# Patient Record
Sex: FEMALE | Race: WHITE | URBAN - METROPOLITAN AREA
[De-identification: names, ages, dates, MRNs, and addresses within clinical notes are randomized per-mention and may not be internally consistent; named-entity substitution may affect disease eponyms.]

---

## 2018-12-05 ENCOUNTER — OFFICE VISIT (OUTPATIENT)
Dept: FAMILY MEDICINE CLINIC | Age: 63
End: 2018-12-05

## 2018-12-05 VITALS
DIASTOLIC BLOOD PRESSURE: 89 MMHG | WEIGHT: 237 LBS | BODY MASS INDEX: 37.2 KG/M2 | RESPIRATION RATE: 16 BRPM | HEART RATE: 83 BPM | HEIGHT: 67 IN | TEMPERATURE: 98.4 F | SYSTOLIC BLOOD PRESSURE: 129 MMHG | OXYGEN SATURATION: 94 %

## 2018-12-05 DIAGNOSIS — J01.81 OTHER ACUTE RECURRENT SINUSITIS: Primary | ICD-10-CM

## 2018-12-05 DIAGNOSIS — E11.9 TYPE 2 DIABETES MELLITUS WITHOUT COMPLICATION, WITHOUT LONG-TERM CURRENT USE OF INSULIN (HCC): ICD-10-CM

## 2018-12-05 RX ORDER — DOXYCYCLINE 100 MG/1
100 TABLET ORAL 2 TIMES DAILY
Qty: 20 TAB | Refills: 0 | Status: SHIPPED | OUTPATIENT
Start: 2018-12-05 | End: 2018-12-15

## 2018-12-05 RX ORDER — ZINC GLUCONATE 10 MG
LOZENGE ORAL
COMMUNITY

## 2018-12-05 RX ORDER — CHOLECALCIFEROL (VITAMIN D3) 125 MCG
400 CAPSULE ORAL
COMMUNITY

## 2018-12-05 RX ORDER — LEVOTHYROXINE SODIUM 75 UG/1
TABLET ORAL
Refills: 3 | COMMUNITY
Start: 2018-11-25 | End: 2019-03-23 | Stop reason: SDUPTHER

## 2018-12-05 RX ORDER — METFORMIN HYDROCHLORIDE 500 MG/1
TABLET ORAL
Refills: 3 | COMMUNITY
Start: 2018-10-19 | End: 2018-12-06 | Stop reason: SDUPTHER

## 2018-12-05 RX ORDER — UREA 10 %
100 LOTION (ML) TOPICAL DAILY
COMMUNITY

## 2018-12-05 RX ORDER — ASPIRIN 81 MG/1
TABLET ORAL DAILY
COMMUNITY

## 2018-12-05 RX ORDER — CLINDAMYCIN HYDROCHLORIDE 300 MG/1
CAPSULE ORAL
Refills: 0 | COMMUNITY
Start: 2018-10-24 | End: 2018-12-05 | Stop reason: CLARIF

## 2018-12-05 RX ORDER — LISINOPRIL 5 MG/1
TABLET ORAL
Refills: 2 | COMMUNITY
Start: 2018-09-02 | End: 2019-06-11 | Stop reason: SDUPTHER

## 2018-12-05 NOTE — PROGRESS NOTES
[address] [facility] [address] Dr[address] Tyler Coronachrystal Chandler Phone:  [phone] Fax:  [phone] Progress Note Name:  Roberto Max Age:  59 y.o. 
:  1955 Encounter Date:  2018 Primary Care Provider:  No primary care provider on file. Chief Complaint Patient presents with  Follow Up Chronic Condition  
  diabetes HPI: 
Patient here to follow-up sinuses and diabetes and determine medication refill and adjustments and appropriate lab evaluation. Patient is compliant with medications and no new side effects. Past Medical History:  
Diagnosis Date  Anxiety  Depression  Diabetes ([address] type 2  
 FHx: breast cancer  HTN (hypertension)  Hypothyroidism  Psoriasis  Varicose vein of leg Past Surgical History:  
Procedure Laterality Date  HX BREAST REDUCTION    
 HX CHOLECYSTECTOMY  HX HYSTERECTOMY  HX OOPHORECTOMY Family History Problem Relation Age of Onset  Breast Cancer Mother  COPD Mother  Alcohol abuse Father Social History Socioeconomic History  Marital status:  Spouse name: Not on file  Number of children: Not on file  Years of education: Not on file  Highest education level: Not on file Tobacco Use  Smoking status: Former Smoker Packs/day: 0.25 Types: Cigarettes Last attempt to quit: 2018 Years since quittin.5  Smokeless tobacco: Never Used Substance and Sexual Activity  Alcohol use: No  
  Frequency: Never  Drug use: No  
 Sexual activity: No  
 
 
Current Outpatient Medications Medication Sig Dispense Refill  ONETOUCH ULTRA BLUE TEST STRIP strip TEST ONCE DAILY AS DIRECTED  3  
 levothyroxine (SYNTHROID) 75 mcg tablet TAKE 1 TABLET BY MOUTH EVERY DAY  3  
 lisinopril (PRINIVIL, ZESTRIL) 5 mg tablet TAKE 1 TABLET BY MOUTH EVERY DAY *NEED APPT*  2  
  metFORMIN (GLUCOPHAGE) 500 mg tablet TAKE 2 TABLETS BY MOUTH EVERY MORNING AND 1 TABLET IN THE EVENING  3  
 aspirin delayed-release 81 mg tablet Take  by mouth daily.  cholecalciferol, vitamin D3, (VITAMIN D3) 2,000 unit tab Take  by mouth.  magnesium 250 mg tab Take  by mouth.  grape seed extract 150 mg TbER Take  by mouth.  cyanocobalamin (VITAMIN B12) 100 mcg tablet Take 100 mcg by mouth daily.  multivitamin, tx-iron-ca-min (THERA-M W/ IRON) 9 mg iron-400 mcg tab tablet Take 1 Tab by mouth daily.  B.infantis-B.ani-B.long-B.bifi (PROBIOTIC 4X) 10-15 mg TbEC Take  by mouth.  doxycycline (ADOXA) 100 mg tablet Take 1 Tab by mouth two (2) times a day for 10 days. 20 Tab 0 Allergies Allergen Reactions  Penicillins Other (comments)  
  rash Patient Active Problem List  
Diagnosis Code  Other acute recurrent sinusitis J01.81  
 [facility] E11.9 Review of Systems Constitutional: Negative for fever. Respiratory: Negative for shortness of breath. Cardiovascular: Negative for chest pain, orthopnea and PND. Gastrointestinal: Negative for abdominal pain, nausea and vomiting. Visit Vitals /89 (BP 1 Location: Left arm, BP Patient Position: Sitting) Pulse 83 Temp 98.4 °F (36.9 °C) (Oral) Resp 16 Ht 5' 7\" (1.702 m) Wt 237 lb (107.5 kg) SpO2 94% BMI 37.12 kg/m² Physical Exam  
Constitutional: She is oriented to person, place, and time and well-developed, well-nourished, and in no distress. Cardiovascular: Normal rate, regular rhythm and normal heart sounds. Pulmonary/Chest: Effort normal and breath sounds normal. No respiratory distress. She has no wheezes. Neurological: She is alert and oriented to person, place, and time. Skin: Skin is warm and dry. Labs/Radiology Reviewed Assessment/Plan: ICD-10-CM ICD-9-CM 1. Other acute recurrent sinusitis J01.81 461.9 doxycycline (ADOXA) 100 mg tablet 2. Type 2 diabetes mellitus without complication, without long-term current use of insulin (HCC) E11.9 250.00 HEMOGLOBIN A1C WITH EAG Orders Placed This Encounter  HEMOGLOBIN A1C WITH EAG  
 doxycycline (ADOXA) 100 mg tablet Follow-up Disposition: Not on File Carlos Mora MD 
12/5/2018

## 2018-12-05 NOTE — PROGRESS NOTES
Identified pt with two pt identifiers(name and ). Chief Complaint Patient presents with  Follow Up Chronic Condition  
  diabetes Health Maintenance Due Topic  Hepatitis C Screening  DTaP/Tdap/Td series (1 - Tdap)  PAP AKA CERVICAL CYTOLOGY  Shingrix Vaccine Age 50> (1 of 2)  BREAST CANCER SCRN MAMMOGRAM   
 FOBT Q 1 YEAR AGE 50-75  Influenza Age 5 to Adult Wt Readings from Last 3 Encounters:  
18 237 lb (107.5 kg) Temp Readings from Last 3 Encounters:  
No data found for Temp BP Readings from Last 3 Encounters:  
No data found for BP Pulse Readings from Last 3 Encounters:  
No data found for Pulse Learning Assessment: 
:  
 
No flowsheet data found. Depression Screening: 
:  
 
No flowsheet data found. Fall Risk Assessment: 
:  
 
No flowsheet data found. Abuse Screening: 
:  
 
No flowsheet data found. Coordination of Care Questionnaire: 
:  
 
1) Have you been to an emergency room, urgent care clinic since your last visit? no  
Hospitalized since your last visit? no          
 
2) Have you seen or consulted any other health care providers outside of [address] [place] [address] since your last visit? no  (Include any pap smears or colon screenings in this section.) 3) Do you have an Advance Directive on file? no 
Are you interested in receiving information about Advance Directives? no 
 
Patient is accompanied by self I have received verbal consent from Kiera Grande to discuss any/all medical information while they are present in the room. Reviewed record in preparation for visit and have obtained necessary documentation. Medication reconciliation up to date and corrected with patient at this time.

## 2018-12-06 LAB
EST. AVERAGE GLUCOSE BLD GHB EST-MCNC: 174 MG/DL
HBA1C MFR BLD: 7.7 % (ref 4.8–5.6)

## 2018-12-06 RX ORDER — METFORMIN HYDROCHLORIDE 500 MG/1
1000 TABLET ORAL 2 TIMES DAILY WITH MEALS
Qty: 180 TAB | Refills: 3 | Status: SHIPPED | OUTPATIENT
Start: 2018-12-06 | End: 2019-06-11 | Stop reason: SDUPTHER

## 2019-05-10 ENCOUNTER — TELEPHONE (OUTPATIENT)
Dept: FAMILY MEDICINE CLINIC | Age: 64
End: 2019-05-10

## 2019-05-12 DIAGNOSIS — I10 HYPERTENSION, UNSPECIFIED TYPE: ICD-10-CM

## 2019-05-12 DIAGNOSIS — E78.00 HYPERCHOLESTEREMIA: ICD-10-CM

## 2019-05-12 DIAGNOSIS — E11.9 TYPE 2 DIABETES MELLITUS WITHOUT COMPLICATION, WITHOUT LONG-TERM CURRENT USE OF INSULIN (HCC): ICD-10-CM

## 2019-05-12 DIAGNOSIS — E03.9 ACQUIRED HYPOTHYROIDISM: ICD-10-CM

## 2019-05-12 DIAGNOSIS — Z00.00 ANNUAL PHYSICAL EXAM: Primary | ICD-10-CM

## 2019-06-06 LAB
ALBUMIN SERPL-MCNC: 4.2 G/DL (ref 3.6–4.8)
ALBUMIN/CREAT UR: <3.9 MG/G CREAT (ref 0–30)
ALBUMIN/GLOB SERPL: 1.6 {RATIO} (ref 1.2–2.2)
ALP SERPL-CCNC: 123 IU/L (ref 39–117)
ALT SERPL-CCNC: 50 IU/L (ref 0–32)
APPEARANCE UR: CLEAR
AST SERPL-CCNC: 37 IU/L (ref 0–40)
BASOPHILS # BLD AUTO: 0 X10E3/UL (ref 0–0.2)
BASOPHILS NFR BLD AUTO: 1 %
BILIRUB SERPL-MCNC: 0.5 MG/DL (ref 0–1.2)
BILIRUB UR QL STRIP: NEGATIVE
BUN SERPL-MCNC: 13 MG/DL (ref 8–27)
BUN/CREAT SERPL: 21 (ref 12–28)
CALCIUM SERPL-MCNC: 10.2 MG/DL (ref 8.7–10.3)
CHLORIDE SERPL-SCNC: 105 MMOL/L (ref 96–106)
CHOLEST SERPL-MCNC: 109 MG/DL (ref 100–199)
CO2 SERPL-SCNC: 21 MMOL/L (ref 20–29)
COLOR UR: YELLOW
CREAT SERPL-MCNC: 0.62 MG/DL (ref 0.57–1)
CREAT UR-MCNC: 76.4 MG/DL
EOSINOPHIL # BLD AUTO: 0.2 X10E3/UL (ref 0–0.4)
EOSINOPHIL NFR BLD AUTO: 2 %
ERYTHROCYTE [DISTWIDTH] IN BLOOD BY AUTOMATED COUNT: 14.1 % (ref 12.3–15.4)
EST. AVERAGE GLUCOSE BLD GHB EST-MCNC: 157 MG/DL
GLOBULIN SER CALC-MCNC: 2.6 G/DL (ref 1.5–4.5)
GLUCOSE SERPL-MCNC: 142 MG/DL (ref 65–99)
GLUCOSE UR QL: NEGATIVE
HBA1C MFR BLD: 7.1 % (ref 4.8–5.6)
HCT VFR BLD AUTO: 45.2 % (ref 34–46.6)
HDLC SERPL-MCNC: 57 MG/DL
HGB BLD-MCNC: 14.9 G/DL (ref 11.1–15.9)
HGB UR QL STRIP: NEGATIVE
IMM GRANULOCYTES # BLD AUTO: 0 X10E3/UL (ref 0–0.1)
IMM GRANULOCYTES NFR BLD AUTO: 0 %
KETONES UR QL STRIP: NEGATIVE
LDLC SERPL CALC-MCNC: 40 MG/DL (ref 0–99)
LEUKOCYTE ESTERASE UR QL STRIP: NEGATIVE
LYMPHOCYTES # BLD AUTO: 2.7 X10E3/UL (ref 0.7–3.1)
LYMPHOCYTES NFR BLD AUTO: 36 %
MCH RBC QN AUTO: 31 PG (ref 26.6–33)
MCHC RBC AUTO-ENTMCNC: 33 G/DL (ref 31.5–35.7)
MCV RBC AUTO: 94 FL (ref 79–97)
MICRO URNS: NORMAL
MICROALBUMIN UR-MCNC: <3 UG/ML
MONOCYTES # BLD AUTO: 0.7 X10E3/UL (ref 0.1–0.9)
MONOCYTES NFR BLD AUTO: 9 %
NEUTROPHILS # BLD AUTO: 4 X10E3/UL (ref 1.4–7)
NEUTROPHILS NFR BLD AUTO: 52 %
NITRITE UR QL STRIP: NEGATIVE
PH UR STRIP: 5.5 [PH] (ref 5–7.5)
PLATELET # BLD AUTO: 201 X10E3/UL (ref 150–450)
POTASSIUM SERPL-SCNC: 5 MMOL/L (ref 3.5–5.2)
PROT SERPL-MCNC: 6.8 G/DL (ref 6–8.5)
PROT UR QL STRIP: NEGATIVE
RBC # BLD AUTO: 4.8 X10E6/UL (ref 3.77–5.28)
SODIUM SERPL-SCNC: 143 MMOL/L (ref 134–144)
SP GR UR: 1.02 (ref 1–1.03)
TRIGL SERPL-MCNC: 61 MG/DL (ref 0–149)
TSH SERPL DL<=0.005 MIU/L-ACNC: 1.26 UIU/ML (ref 0.45–4.5)
UROBILINOGEN UR STRIP-MCNC: 0.2 MG/DL (ref 0.2–1)
VLDLC SERPL CALC-MCNC: 12 MG/DL (ref 5–40)
WBC # BLD AUTO: 7.6 X10E3/UL (ref 3.4–10.8)

## 2019-06-11 ENCOUNTER — OFFICE VISIT (OUTPATIENT)
Dept: FAMILY MEDICINE CLINIC | Age: 64
End: 2019-06-11

## 2019-06-11 VITALS
HEIGHT: 67 IN | TEMPERATURE: 98.9 F | SYSTOLIC BLOOD PRESSURE: 130 MMHG | DIASTOLIC BLOOD PRESSURE: 87 MMHG | OXYGEN SATURATION: 97 % | RESPIRATION RATE: 16 BRPM | WEIGHT: 230 LBS | HEART RATE: 85 BPM | BODY MASS INDEX: 36.1 KG/M2

## 2019-06-11 DIAGNOSIS — E11.9 TYPE 2 DIABETES MELLITUS WITHOUT COMPLICATION, WITHOUT LONG-TERM CURRENT USE OF INSULIN (HCC): ICD-10-CM

## 2019-06-11 DIAGNOSIS — E03.9 ACQUIRED HYPOTHYROIDISM: ICD-10-CM

## 2019-06-11 DIAGNOSIS — E66.01 SEVERE OBESITY (HCC): ICD-10-CM

## 2019-06-11 DIAGNOSIS — I10 HYPERTENSION, UNSPECIFIED TYPE: Primary | ICD-10-CM

## 2019-06-11 DIAGNOSIS — R79.89 ABNORMAL LFTS: ICD-10-CM

## 2019-06-11 RX ORDER — LEVOTHYROXINE SODIUM 75 UG/1
TABLET ORAL
Qty: 90 TAB | Refills: 3 | Status: SHIPPED | OUTPATIENT
Start: 2019-06-11 | End: 2020-05-19 | Stop reason: SDUPTHER

## 2019-06-11 RX ORDER — METFORMIN HYDROCHLORIDE 500 MG/1
1000 TABLET ORAL 2 TIMES DAILY WITH MEALS
Qty: 360 TAB | Refills: 3 | Status: SHIPPED | OUTPATIENT
Start: 2019-06-11 | End: 2020-05-19 | Stop reason: SDUPTHER

## 2019-06-11 RX ORDER — LISINOPRIL 5 MG/1
TABLET ORAL
Qty: 90 TAB | Refills: 3 | Status: SHIPPED | OUTPATIENT
Start: 2019-06-11 | End: 2020-05-19 | Stop reason: SDUPTHER

## 2019-06-11 NOTE — PROGRESS NOTES
[address] [facility]  [address], [address] [facility], [address] [place] [address]  Dr. Kristina Miranda. Timmy Barrera  Phone:  [phone]  Fax:  [phone]    Progress Note    Name:  Merlin Dowse  Age:  62 y.o.  :  1955  Encounter Date:  2019    Primary Care Provider:  No primary care provider on file. Chief Complaint   Patient presents with    Labs     FOLLOW UP     HPI:  Patient here for annual exam and determine medication refill and adjustments and appropriate lab evaluation. Patient is compliant with medications and no new side effects. Past Medical History:   Diagnosis Date    Anxiety     Depression     Diabetes ([address]     type 2    FHx: breast cancer     HTN (hypertension)     Hypothyroidism     Psoriasis     Varicose vein of leg      Past Surgical History:   Procedure Laterality Date    HX BREAST REDUCTION      HX CHOLECYSTECTOMY      HX HYSTERECTOMY      HX OOPHORECTOMY       Family History   Problem Relation Age of Onset    Breast Cancer Mother     COPD Mother     Alcohol abuse Father      Social History     Socioeconomic History    Marital status:      Spouse name: Not on file    Number of children: Not on file    Years of education: Not on file    Highest education level: Not on file   Tobacco Use    Smoking status: Former Smoker     Packs/day: 0.25     Types: Cigarettes     Last attempt to quit: 2018     Years since quittin.1    Smokeless tobacco: Never Used   Substance and Sexual Activity    Alcohol use: No     Frequency: Never    Drug use: No    Sexual activity: Never     COLONOSCOPY: 2019-- decline colonscopy this visit. MAMMOGRAM: 2019-- decline mammograms this visit. She will see Gyn soon. INFLUENZA VACCINE: was last done 2019- declines   TETANUS VACCINE:2019--  It has been 10 years since your last tetanus shot. If you cut yourself, please schedule an appointment and come to the office within 3 days for a tetanus booster.      Shingles: 2019 declined  PAP smear -- no more since Hysterectomy  No alcohol and no smoking  Exercise -- trying to increase      Current Outpatient Medications   Medication Sig Dispense Refill    levothyroxine (SYNTHROID) 75 mcg tablet TAKE 1 TABLET BY MOUTH EVERY DAY 90 Tab 3    metFORMIN (GLUCOPHAGE) 500 mg tablet Take 2 Tabs by mouth two (2) times daily (with meals). 360 Tab 3    ONETOUCH ULTRA BLUE TEST STRIP strip TEST ONCE DAILY AS DIRECTED 90 Strip 3    lisinopril (PRINIVIL, ZESTRIL) 5 mg tablet TAKE 1 TABLET BY MOUTH EVERY DAY 90 Tab 3    aspirin delayed-release 81 mg tablet Take  by mouth daily.  cholecalciferol, vitamin D3, (VITAMIN D3) 2,000 unit tab Take  by mouth.  grape seed extract 150 mg TbER Take  by mouth.  cyanocobalamin (VITAMIN B12) 100 mcg tablet Take 100 mcg by mouth daily.  multivitamin, tx-iron-ca-min (THERA-M W/ IRON) 9 mg iron-400 mcg tab tablet Take 1 Tab by mouth daily.  B.infantis-B.ani-B.long-B.bifi (PROBIOTIC 4X) 10-15 mg TbEC Take  by mouth.  magnesium 250 mg tab Take  by mouth. Allergies   Allergen Reactions    Penicillins Other (comments)     rash     Patient Active Problem List   Diagnosis Code    Other acute recurrent sinusitis J01.81    Diabetes ([address] E11.9    Severe obesity ([facility]) E66.01       ROS    Visit Vitals  /87   Pulse 85   Temp 98.9 °F (37.2 °C) (Oral)   Resp 16   Ht 5' 7\" (1.702 m)   Wt 230 lb (104.3 kg)   SpO2 97%   BMI 36.02 kg/m²     Physical Exam   Constitutional: She is oriented to person, place, and time and well-developed, well-nourished, and in no distress. Cardiovascular: Normal rate, regular rhythm and normal heart sounds. Pulmonary/Chest: Effort normal and breath sounds normal. No respiratory distress. She has no wheezes. Neurological: She is alert and oriented to person, place, and time. Skin: Skin is warm and dry.    pt declined breast exam    Labs/Radiology Reviewed    Assessment/Plan:    ICD-10-CM ICD-9-CM 1. Hypertension, unspecified type I10 401.9 lisinopril (PRINIVIL, ZESTRIL) 5 mg tablet   2. Severe obesity ([address] E66.01 278.01    3. Type 2 diabetes mellitus without complication, without long-term current use of insulin (HCC) E11.9 250.00 metFORMIN (GLUCOPHAGE) 500 mg tablet      ONETOUCH ULTRA BLUE TEST STRIP strip      HEMOGLOBIN A1C WITH EAG   4. Acquired hypothyroidism E03.9 244.9 levothyroxine (SYNTHROID) 75 mcg tablet   5. Abnormal LFTs R94.5 790.6 HEPATITIS PANEL, ACUTE      HEPATIC FUNCTION PANEL       Orders Placed This Encounter    HEPATITIS PANEL, ACUTE    HEPATIC FUNCTION PANEL    HEMOGLOBIN A1C WITH EAG    levothyroxine (SYNTHROID) 75 mcg tablet    metFORMIN (GLUCOPHAGE) 500 mg tablet    ONETOUCH ULTRA BLUE TEST STRIP strip    lisinopril (PRINIVIL, ZESTRIL) 5 mg tablet       Follow-up and Dispositions    · Return in about 6 months (around 12/11/2019) for for diabetes follow up. LFT's need to be rechecked in 6 months and A1C in 6 months. Pt is losing weight which is good. Health is stable, life style good, immunizations reviewed and screening discussed and done as per patient's desires. Exercise includes 4 components:  Stretching, core muscle, cardiovascular and balance techniques. Good posture is protective to your back - stand straight as much as possible. Exercise daily of 40 minutes of active exercise encouraged, a balanced diet with portions of fruits, vegetables and salads recommended. Watch sodium intake if high blood pressure is an issue. Labs reviewed with pt.      Roseann Wilhelm MD  6/11/2019

## 2019-06-11 NOTE — PROGRESS NOTES
PATIENT STATED NAME &     Chief Complaint   Patient presents with   [address] Maintenance Due   Topic    Hepatitis C Screening     Pneumococcal 0-64 years (1 of 1 - PPSV23)    FOOT EXAM Q1     EYE EXAM RETINAL OR DILATED     DTaP/Tdap/Td series (1 - Tdap)    PAP AKA CERVICAL CYTOLOGY     Shingrix Vaccine Age 50> (1 of 2)    BREAST CANCER SCRN MAMMOGRAM     FOBT Q 1 YEAR AGE 50-75        Wt Readings from Last 3 Encounters:   19 230 lb (104.3 kg)   18 237 lb (107.5 kg)     Temp Readings from Last 3 Encounters:   19 98.9 °F (37.2 °C) (Oral)   18 98.4 °F (36.9 °C) (Oral)     BP Readings from Last 3 Encounters:   19 130/87   18 129/89     Pulse Readings from Last 3 Encounters:   19 85   18 83         Learning Assessment:  :     Learning Assessment 2018   PRIMARY LEARNER Patient   PRIMARY LANGUAGE ENGLISH   LEARNER PREFERENCE PRIMARY DEMONSTRATION   ANSWERED BY patient   RELATIONSHIP SELF       Depression Screening:  :     3 most recent PHQ Screens 2018   Little interest or pleasure in doing things Several days   Feeling down, depressed, irritable, or hopeless Several days   Total Score PHQ 2 2       Fall Risk Assessment:  :     No flowsheet data found. Abuse Screening:  :     Abuse Screening Questionnaire 2018   Do you ever feel afraid of your partner? N   Are you in a relationship with someone who physically or mentally threatens you? N   Is it safe for you to go home? Y       Coordination of Care Questionnaire:  :     1) Have you been to an emergency room, urgent care clinic since your last visit? NO    Hospitalized since your last visit? NO             2) Have you seen or consulted any other health care providers outside of [address] [place] [address] since your last visit?  NO    Patient is accompanied by self I have received verbal consent from Toy Oneil to discuss any/all medical information while they are present in the room.

## 2019-06-16 DIAGNOSIS — E11.9 TYPE 2 DIABETES MELLITUS WITHOUT COMPLICATION, WITHOUT LONG-TERM CURRENT USE OF INSULIN (HCC): ICD-10-CM

## 2019-12-04 LAB
ALBUMIN SERPL-MCNC: 4.2 G/DL (ref 3.6–4.8)
ALP SERPL-CCNC: 130 IU/L (ref 39–117)
ALT SERPL-CCNC: 49 IU/L (ref 0–32)
AST SERPL-CCNC: 24 IU/L (ref 0–40)
BILIRUB DIRECT SERPL-MCNC: 0.16 MG/DL (ref 0–0.4)
BILIRUB SERPL-MCNC: 0.5 MG/DL (ref 0–1.2)
EST. AVERAGE GLUCOSE BLD GHB EST-MCNC: 154 MG/DL
HAV IGM SERPL QL IA: NEGATIVE
HBA1C MFR BLD: 7 % (ref 4.8–5.6)
HBV CORE IGM SERPL QL IA: NEGATIVE
HBV SURFACE AG SERPL QL IA: NEGATIVE
HCV AB S/CO SERPL IA: <0.1 S/CO RATIO (ref 0–0.9)
PROT SERPL-MCNC: 6.9 G/DL (ref 6–8.5)

## 2019-12-09 ENCOUNTER — OFFICE VISIT (OUTPATIENT)
Dept: FAMILY MEDICINE CLINIC | Age: 64
End: 2019-12-09

## 2019-12-09 VITALS
HEIGHT: 67 IN | TEMPERATURE: 99.3 F | HEART RATE: 105 BPM | BODY MASS INDEX: 36.41 KG/M2 | WEIGHT: 232 LBS | RESPIRATION RATE: 16 BRPM | SYSTOLIC BLOOD PRESSURE: 111 MMHG | DIASTOLIC BLOOD PRESSURE: 78 MMHG | OXYGEN SATURATION: 96 %

## 2019-12-09 DIAGNOSIS — J32.9 SINUSITIS, UNSPECIFIED CHRONICITY, UNSPECIFIED LOCATION: ICD-10-CM

## 2019-12-09 DIAGNOSIS — R79.89 LFT ELEVATION: ICD-10-CM

## 2019-12-09 DIAGNOSIS — Z12.11 COLON CANCER SCREENING: Primary | ICD-10-CM

## 2019-12-09 RX ORDER — AZITHROMYCIN 250 MG/1
TABLET, FILM COATED ORAL
Qty: 6 TAB | Refills: 0 | Status: SHIPPED | OUTPATIENT
Start: 2019-12-09 | End: 2019-12-14

## 2019-12-09 NOTE — PROGRESS NOTES
[address] [facility]  [address], [address] [facility], [address] [place] [address]  Dr. Georgia Strong. Erik Pap  Phone:  [phone]  Fax:  [phone]    Progress Note    Name:  Jeffrey Nguyễn  Age:  62 y.o.  :  1955  Encounter Date:  2019    Primary Care Provider:  No primary care provider on file. Chief Complaint   Patient presents with   [facility]     Patient reports not currently fasting. HPI:  Patient here to follow-up LFT's and A1C and determine medication refill and adjustments and appropriate lab evaluation. Patient is compliant with medications and no new side effects. She also has sinus irritation for 7-10 days. Past Medical History:   Diagnosis Date    Anxiety     Depression     Diabetes ([facility]     type 2    FHx: breast cancer     HTN (hypertension)     Hypothyroidism     Psoriasis     Varicose vein of leg      Past Surgical History:   Procedure Laterality Date    HX BREAST REDUCTION      HX CHOLECYSTECTOMY      HX HYSTERECTOMY      HX OOPHORECTOMY       Family History   Problem Relation Age of Onset    Breast Cancer Mother     COPD Mother     Alcohol abuse Father      Social History     Socioeconomic History    Marital status:      Spouse name: Not on file    Number of children: Not on file    Years of education: Not on file    Highest education level: Not on file   Tobacco Use    Smoking status: Former Smoker     Packs/day: 0.25     Types: Cigarettes     Last attempt to quit: 2018     Years since quittin.6    Smokeless tobacco: Never Used   Substance and Sexual Activity    Alcohol use: No     Frequency: Never    Drug use: No    Sexual activity: Never       Current Outpatient Medications   Medication Sig Dispense Refill    levothyroxine (SYNTHROID) 75 mcg tablet TAKE 1 TABLET BY MOUTH EVERY DAY 90 Tab 3    metFORMIN (GLUCOPHAGE) 500 mg tablet Take 2 Tabs by mouth two (2) times daily (with meals).  360 Tab 3    lisinopril (PRINIVIL, ZESTRIL) 5 mg tablet TAKE 1 TABLET BY MOUTH EVERY DAY 90 Tab 3    aspirin delayed-release 81 mg tablet Take  by mouth daily.  cholecalciferol, vitamin D3, (VITAMIN D3) 2,000 unit tab Take 400 Units by mouth.  magnesium 250 mg tab Take  by mouth.  grape seed extract 150 mg TbER Take  by mouth.  cyanocobalamin (VITAMIN B12) 100 mcg tablet Take 100 mcg by mouth daily.  multivitamin, tx-iron-ca-min (THERA-M W/ IRON) 9 mg iron-400 mcg tab tablet Take 1 Tab by mouth daily.  B.infantis-B.ani-B.long-B.bifi (PROBIOTIC 4X) 10-15 mg TbEC Take  by mouth.  ONETOUCH ULTRA BLUE TEST STRIP strip TEST ONCE DAILY AS DIRECTED 100 Strip 3     Allergies   Allergen Reactions    Penicillins Other (comments)     rash     Patient Active Problem List   Diagnosis Code    Other acute recurrent sinusitis J01.81    Diabetes ([facility] E11.9    Severe obesity ([facility] E66.01       Review of Systems   Constitutional: Negative for fever. Respiratory: Negative for shortness of breath. Cardiovascular: Negative for chest pain, orthopnea and PND. Gastrointestinal: Negative for abdominal pain, nausea and vomiting. Visit Vitals  /78 (BP 1 Location: Left arm, BP Patient Position: Sitting)   Pulse (!) 105   Temp 99.3 °F (37.4 °C) (Oral)   Resp 16   Ht 5' 7\" (1.702 m)   Wt 232 lb (105.2 kg)   SpO2 96%   BMI 36.34 kg/m²     Physical Exam  Cardiovascular:      Rate and Rhythm: Normal rate and regular rhythm. Heart sounds: Normal heart sounds. Pulmonary:      Effort: Pulmonary effort is normal. No respiratory distress. Breath sounds: Normal breath sounds. No wheezing. Skin:     General: Skin is warm and dry. Neurological:      Mental Status: She is alert and oriented to person, place, and time. Labs/Radiology Reviewed    Assessment/Plan:    ICD-10-CM ICD-9-CM    1. Colon cancer screening Z12.11 V76.51 REFERRAL TO GASTROENTEROLOGY   2.  Sinusitis, unspecified chronicity, unspecified location J32.9 473.9    3. LFT elevation R94.5 790.6        Orders Placed This Encounter    [facility]     Patient will get Dr. Tucker Greenfield final opinion about LFT's. Diabetes is well controlled. She has a sinus infection. We will treat.          Fern Jackman MD  12/9/2019

## 2019-12-09 NOTE — LETTER
12/9/2019 10:59 AM 
 
Ms. Bony Cohen [address] [place] [address] Dear Bony Cohen: 
 
Please find your most recent results below. Resulted Orders HEPATITIS PANEL, ACUTE (Collected: 12/3/2019  8:44 AM) Result Value Ref Range Hepatitis A Ab, IgM Negative Negative Hep B surface Ag screen Negative Negative Hep B Core Ab, IgM Negative Negative Hep C Virus Ab <0.1 0.0 - 0.9 s/co ratio Comment:  
                                     Negative:     < 0.8 Indeterminate: 0.8 - 0.9 Positive:     > 0.9 The CDC recommends that a positive HCV antibody result 
 be followed up with a HCV Nucleic Acid Amplification 
 test ([id number]). Narrative Performed at:  [address]  [id number] : Donal Canales MD, Phone:  [phone] HEPATIC FUNCTION PANEL (Collected: 12/3/2019  8:44 AM) Result Value Ref Range Protein, total 6.9 6.0 - 8.5 g/dL Albumin 4.2 3.6 - 4.8 g/dL Bilirubin, total 0.5 0.0 - 1.2 mg/dL Bilirubin, direct 0.16 0.00 - 0.40 mg/dL Alk. phosphatase 130 (H) 39 - 117 IU/L  
 AST (SGOT) 24 0 - 40 IU/L  
 ALT (SGPT) 49 (H) 0 - 32 IU/L Narrative Performed at:  [address]  [id number] : Donal Canales MD, Phone:  [phone] HEMOGLOBIN A1C WITH EAG (Collected: 12/3/2019  8:44 AM) Result Value Ref Range Hemoglobin A1c 7.0 (H) 4.8 - 5.6 % Comment:  
            Prediabetes: 5.7 - 6.4 Diabetes: >6.4 Glycemic control for adults with diabetes: <7.0 Estimated average glucose 154 mg/dL Narrative Performed at:  [address]  [id number] : Donal Canales MD, Phone:  [phone] RECOMMENDATIONS: 
Discussed at appt.  
 
 
Sincerely, 
 
 
Chante Coelho MD

## 2019-12-09 NOTE — LETTER
12/9/2019 11:06 AM 
 
Ms. Jeramie Vargas [address] [address] [address]. Bridgett Rhoades, Has elevation of liver enzyme. Negative Hepatitis screen. Do you recommend anything else? She has her recent lab. Felipe Dumont you, Yogesh Rose

## 2019-12-09 NOTE — PROGRESS NOTES
1. Have you been to the ER, urgent care clinic since your last visit? Hospitalized since your last visit? No    2. Have you seen or consulted any other health care providers outside of the [address] [place] [address] since your last visit? Include any pap smears or colon screening. No     Chief Complaint   Patient presents with    Labs     Patient reports not currently fasting.        Visit Vitals  /78 (BP 1 Location: Left arm, BP Patient Position: Sitting)   Pulse (!) 105   Temp 99.3 °F (37.4 °C) (Oral)   Resp 16   Ht 5' 7\" (1.702 m)   Wt 232 lb (105.2 kg)   SpO2 96%   BMI 36.34 kg/m²

## 2020-03-23 ENCOUNTER — DOCUMENTATION ONLY (OUTPATIENT)
Dept: FAMILY MEDICINE CLINIC | Age: 65
End: 2020-03-23

## 2020-03-23 ENCOUNTER — TELEPHONE (OUTPATIENT)
Dept: FAMILY MEDICINE CLINIC | Age: 65
End: 2020-03-23

## 2020-03-23 NOTE — TELEPHONE ENCOUNTER
Pt states that she has had seen by the specialist no biopsy needed, colonoscopy was scheduled until further noticed. Looking for recommendation for what to do next. F/u is required in April. Pt states that she has no worries just wanted to be in contact with you on her care.

## 2020-05-19 ENCOUNTER — VIRTUAL VISIT (OUTPATIENT)
Dept: FAMILY MEDICINE CLINIC | Age: 65
End: 2020-05-19

## 2020-05-19 VITALS — WEIGHT: 242 LBS | HEIGHT: 67 IN | BODY MASS INDEX: 37.98 KG/M2

## 2020-05-19 DIAGNOSIS — I10 HYPERTENSION, UNSPECIFIED TYPE: ICD-10-CM

## 2020-05-19 DIAGNOSIS — E11.9 TYPE 2 DIABETES MELLITUS WITHOUT COMPLICATION, WITHOUT LONG-TERM CURRENT USE OF INSULIN (HCC): ICD-10-CM

## 2020-05-19 DIAGNOSIS — E78.00 HYPERCHOLESTEREMIA: Primary | ICD-10-CM

## 2020-05-19 DIAGNOSIS — E03.9 ACQUIRED HYPOTHYROIDISM: ICD-10-CM

## 2020-05-19 RX ORDER — LEVOTHYROXINE SODIUM 75 UG/1
TABLET ORAL
Qty: 90 TAB | Refills: 3 | Status: SHIPPED | OUTPATIENT
Start: 2020-05-19 | End: 2021-03-09 | Stop reason: SDUPTHER

## 2020-05-19 RX ORDER — METFORMIN HYDROCHLORIDE 500 MG/1
1000 TABLET ORAL 2 TIMES DAILY WITH MEALS
Qty: 360 TAB | Refills: 3 | Status: SHIPPED | OUTPATIENT
Start: 2020-05-19 | End: 2021-03-09 | Stop reason: SDUPTHER

## 2020-05-19 RX ORDER — LISINOPRIL 5 MG/1
TABLET ORAL
Qty: 90 TAB | Refills: 3 | Status: SHIPPED | OUTPATIENT
Start: 2020-05-19 | End: 2021-03-09 | Stop reason: SDUPTHER

## 2020-05-19 NOTE — PROGRESS NOTES
Yuly Mcmullen is a 60 y.o. female who was seen by synchronous (real-time) audio-video technology on 5/19/2020. Consent: Yuly Mcmullen, who was seen by synchronous (real-time) audio-video technology, and/or her healthcare decision maker, is aware that this patient-initiated, Telehealth encounter on 5/19/2020 is a billable service, with coverage as determined by her insurance carrier. She is aware that she may receive a bill and has provided verbal consent to proceed: Yes. Pt is asymptomatic and condition is stable. Medicine refill needed. Her annual is due in June. Lab work is needed. She saw GI and evaluated LFT's and there is no need for liver biopsy. Assessment & Plan:       Doing well. Orders Placed This Encounter    LIPID PANEL     Standing Status:   Future     Standing Expiration Date:   8/29/4446    METABOLIC PANEL, COMPREHENSIVE     Standing Status:   Future     Standing Expiration Date:   5/19/2021    HEMOGLOBIN A1C WITH EAG     Standing Status:   Future     Standing Expiration Date:   5/19/2021    MICROALBUMIN, UR, RAND W/ MICROALB/CREAT RATIO     Standing Status:   Future     Standing Expiration Date:   5/19/2021    glucose blood VI test strips (OneTouch Ultra Blue Test Strip) strip     Sig: Use one per day     Dispense:  100 Strip     Refill:  3    levothyroxine (SYNTHROID) 75 mcg tablet     Sig: TAKE 1 TABLET BY MOUTH EVERY DAY     Dispense:  90 Tab     Refill:  3    metFORMIN (GLUCOPHAGE) 500 mg tablet     Sig: Take 2 Tabs by mouth two (2) times daily (with meals). Dispense:  360 Tab     Refill:  3    lisinopriL (PRINIVIL, ZESTRIL) 5 mg tablet     Sig: TAKE 1 TABLET BY MOUTH EVERY DAY     Dispense:  90 Tab     Refill:  3       ICD-10-CM ICD-9-CM    1. Hypercholesteremia E78.00 272.0 LIPID PANEL      METABOLIC PANEL, COMPREHENSIVE   2.  Type 2 diabetes mellitus without complication, without long-term current use of insulin (HCC) E11.9 250.00 glucose blood VI test strips (OneTouch Ultra Blue Test Strip) strip      metFORMIN (GLUCOPHAGE) 500 mg tablet      HEMOGLOBIN A1C WITH EAG      MICROALBUMIN, UR, RAND W/ MICROALB/CREAT RATIO   3. Acquired hypothyroidism E03.9 244.9 levothyroxine (SYNTHROID) 75 mcg tablet   4. Hypertension, unspecified type I10 401.9 lisinopriL (PRINIVIL, ZESTRIL) 5 mg tablet      METABOLIC PANEL, COMPREHENSIVE      MICROALBUMIN, UR, RAND W/ MICROALB/CREAT RATIO     Follow-up and Dispositions    · Return in about 6 months (around 11/19/2020) for annual exam with lab work. I spent at least 23 minutes on this visit with this established patient. ([id number])    Subjective:   Adri Davila is a 60 y.o. female who was seen for Diabetes      Prior to Admission medications    Medication Sig Start Date End Date Taking? Authorizing Provider   glucose blood VI test strips (OneTouch Ultra Blue Test Strip) strip Use one per day 5/19/20  Yes Bin Rizvi MD   levothyroxine (SYNTHROID) 75 mcg tablet TAKE 1 TABLET BY MOUTH EVERY DAY 5/19/20  Yes Bin Riziv MD   metFORMIN (GLUCOPHAGE) 500 mg tablet Take 2 Tabs by mouth two (2) times daily (with meals). 5/19/20  Yes Bin Rizvi MD   lisinopriL (PRINIVIL, ZESTRIL) 5 mg tablet TAKE 1 TABLET BY MOUTH EVERY DAY 5/19/20  Yes Bin Rizvi MD   aspirin delayed-release 81 mg tablet Take  by mouth daily. Yes Provider, Historical   cholecalciferol, vitamin D3, (VITAMIN D3) 2,000 unit tab Take 400 Units by mouth. Yes Provider, Historical   magnesium 250 mg tab Take  by mouth. Yes Provider, Historical   grape seed extract 150 mg TbER Take  by mouth. Yes Provider, Historical   cyanocobalamin (VITAMIN B12) 100 mcg tablet Take 100 mcg by mouth daily. Yes Provider, Historical   multivitamin, tx-iron-ca-min (THERA-M W/ IRON) 9 mg iron-400 mcg tab tablet Take 1 Tab by mouth daily. Yes Provider, Historical   B.infantis-B.ani-B.long-B.bifi (PROBIOTIC 4X) 10-15 mg TbEC Take  by mouth.    Yes Provider, Historical   ONETOUCH ULTRA BLUE TEST STRIP strip TEST ONCE DAILY AS DIRECTED 6/16/19 5/19/20  Ailin Cunningham MD   levothyroxine (SYNTHROID) 75 mcg tablet TAKE 1 TABLET BY MOUTH EVERY DAY 6/11/19 5/19/20  Ailin Cunningham MD   metFORMIN (GLUCOPHAGE) 500 mg tablet Take 2 Tabs by mouth two (2) times daily (with meals). 6/11/19 5/19/20  Ailin Cunningham MD   lisinopril (PRINIVIL, ZESTRIL) 5 mg tablet TAKE 1 TABLET BY MOUTH EVERY DAY 6/11/19 5/19/20  Ailin Cunningham MD     Allergies   Allergen Reactions    Penicillins Other (comments)     rash           Review of Systems   Constitutional: Negative for fever. Respiratory: Negative for shortness of breath. Cardiovascular: Negative for chest pain, orthopnea and PND. Gastrointestinal: Negative for abdominal pain, nausea and vomiting.        Objective:   Vital Signs: (As obtained by patient/caregiver at home)  Visit Vitals  Ht 5' 7\" (1.702 m)   Wt 242 lb (109.8 kg)   BMI 37.90 kg/m²        Constitutional: [x] Appears well-developed and well-nourished [x] No apparent distress      [] Abnormal -     Mental status: [x] Alert and awake  [x] Oriented to person/place/time [x] Able to follow commands    [] Abnormal -     Eyes:   EOM    [x]  Normal    [] Abnormal -   Sclera  [x]  Normal    [] Abnormal -          Discharge [x]  None visible   [] Abnormal -     HENT: [x] Normocephalic, atraumatic  [] Abnormal -   [] Mouth/Throat: Mucous membranes are moist    External Ears [x] Normal  [] Abnormal -    Neck: [x] No visualized mass [] Abnormal -     Pulmonary/Chest: [x] Respiratory effort normal   [x] No visualized signs of difficulty breathing or respiratory distress        [] Abnormal -      Musculoskeletal:   [x] Normal gait with no signs of ataxia         [] Normal range of motion of neck        [] Abnormal -     Neurological:        [x] No Facial Asymmetry (Cranial nerve 7 motor function) (limited exam due to video visit)          [] No gaze palsy        [] Abnormal -          Skin:        [] No significant exanthematous lesions or discoloration noted on facial skin         [] Abnormal -            Psychiatric:       [x] Normal Affect [] Abnormal -        [] No Hallucinations    Other pertinent observable physical exam findings:-        We discussed the expected course, resolution and complications of the diagnosis(es) in detail. Medication risks, benefits, costs, interactions, and alternatives were discussed as indicated. I advised her to contact the office if her condition worsens, changes or fails to improve as anticipated. She expressed understanding with the diagnosis(es) and plan. Sirena Olivarez is a 60 y.o. female who was evaluated by a video visit encounter for concerns as above. Patient identification was verified prior to start of the visit. A caregiver was present when appropriate. Due to this being a TeleHealth encounter (During [facility]- public health emergency), evaluation of the following organ systems was limited: Vitals/Constitutional/EENT/Resp/CV/GI//MS/Neuro/Skin/Heme-Lymph-Imm. Pursuant to the emergency declaration under the [facility], [facility] waiver authority and the [employer] Act, this Virtual  Visit was conducted, with patient's (and/or legal guardian's) consent, to reduce the patient's risk of exposure to COVID-19 and provide necessary medical care. Services were provided through a video synchronous discussion virtually to substitute for in-person clinic visit. Pt is at her home and I am in my office.

## 2020-05-19 NOTE — PROGRESS NOTES
Afshin Hines is a 60 y.o. female      Chief Complaint   Patient presents with    Diabetes         1. Have you been to the ER, urgent care clinic since your last visit? Hospitalized since your last visit? no      2. Have you seen or consulted any other health care providers outside of the [address] [place] [address] since your last visit? Include any pap smears or colon screening.   no

## 2020-06-26 ENCOUNTER — HOSPITAL ENCOUNTER (OUTPATIENT)
Dept: LAB | Age: 65
Discharge: HOME | End: 2020-06-26

## 2020-06-26 DIAGNOSIS — E78.00 HYPERCHOLESTEREMIA: ICD-10-CM

## 2020-06-26 DIAGNOSIS — I10 HYPERTENSION, UNSPECIFIED TYPE: ICD-10-CM

## 2020-06-26 DIAGNOSIS — E11.9 TYPE 2 DIABETES MELLITUS WITHOUT COMPLICATION, WITHOUT LONG-TERM CURRENT USE OF INSULIN (HCC): ICD-10-CM

## 2020-06-26 LAB
ALBUMIN SERPL-MCNC: 4 G/DL (ref 3.5–5)
ALBUMIN/GLOB SERPL: 1.3 {RATIO} (ref 1.1–2.2)
ALP SERPL-CCNC: 128 U/L (ref 45–117)
ALT SERPL-CCNC: 101 U/L (ref 12–78)
ANION GAP SERPL CALC-SCNC: 7 MMOL/L (ref 5–15)
AST SERPL-CCNC: 47 U/L (ref 15–37)
BILIRUB SERPL-MCNC: 0.5 MG/DL (ref 0.2–1)
BUN SERPL-MCNC: 16 MG/DL (ref 6–20)
BUN/CREAT SERPL: 25 (ref 12–20)
CALCIUM SERPL-MCNC: 9.7 MG/DL (ref 8.5–10.1)
CHLORIDE SERPL-SCNC: 107 MMOL/L (ref 97–108)
CHOLEST SERPL-MCNC: 117 MG/DL
CO2 SERPL-SCNC: 24 MMOL/L (ref 21–32)
CREAT SERPL-MCNC: 0.65 MG/DL (ref 0.55–1.02)
CREAT UR-MCNC: 150 MG/DL
EST. AVERAGE GLUCOSE BLD GHB EST-MCNC: 180 MG/DL
GLOBULIN SER CALC-MCNC: 3.2 G/DL (ref 2–4)
GLUCOSE SERPL-MCNC: 171 MG/DL (ref 65–100)
HBA1C MFR BLD: 7.9 % (ref 4–5.6)
HDLC SERPL-MCNC: 58 MG/DL
HDLC SERPL: 2 {RATIO} (ref 0–5)
LDLC SERPL CALC-MCNC: 44.6 MG/DL (ref 0–100)
LIPID PROFILE,FLP: NORMAL
MICROALBUMIN UR-MCNC: 0.98 MG/DL
MICROALBUMIN/CREAT UR-RTO: 7 MG/G (ref 0–30)
POTASSIUM SERPL-SCNC: 4.6 MMOL/L (ref 3.5–5.1)
PROT SERPL-MCNC: 7.2 G/DL (ref 6.4–8.2)
SODIUM SERPL-SCNC: 138 MMOL/L (ref 136–145)
TRIGL SERPL-MCNC: 72 MG/DL (ref ?–150)
VLDLC SERPL CALC-MCNC: 14.4 MG/DL

## 2020-06-29 ENCOUNTER — TELEPHONE (OUTPATIENT)
Dept: FAMILY MEDICINE CLINIC | Age: 65
End: 2020-06-29

## 2020-06-29 NOTE — TELEPHONE ENCOUNTER
Call from Call Center:      [facility], [phone]              General Message/Vendor Calls         Pt is requesting to speak with nurse in regard to test results.         Callback required   Best contact number(s):  [phone]               Shahana Ramirez          Thank you

## 2020-06-30 ENCOUNTER — TELEPHONE (OUTPATIENT)
Dept: FAMILY MEDICINE CLINIC | Age: 65
End: 2020-06-30

## 2020-06-30 DIAGNOSIS — R79.89 LFT ELEVATION: Primary | ICD-10-CM

## 2020-06-30 DIAGNOSIS — E11.9 TYPE 2 DIABETES MELLITUS WITHOUT COMPLICATION, WITHOUT LONG-TERM CURRENT USE OF INSULIN (HCC): ICD-10-CM

## 2020-06-30 DIAGNOSIS — R79.89 LFT ELEVATION: ICD-10-CM

## 2020-09-15 ENCOUNTER — OFFICE VISIT (OUTPATIENT)
Dept: FAMILY MEDICINE CLINIC | Age: 65
End: 2020-09-15
Payer: COMMERCIAL

## 2020-09-15 VITALS
BODY MASS INDEX: 36.63 KG/M2 | SYSTOLIC BLOOD PRESSURE: 121 MMHG | HEIGHT: 67 IN | TEMPERATURE: 96.3 F | WEIGHT: 233.4 LBS | HEART RATE: 50 BPM | RESPIRATION RATE: 16 BRPM | OXYGEN SATURATION: 97 % | DIASTOLIC BLOOD PRESSURE: 58 MMHG

## 2020-09-15 DIAGNOSIS — R79.89 LFT ELEVATION: Primary | ICD-10-CM

## 2020-09-15 DIAGNOSIS — E11.9 TYPE 2 DIABETES MELLITUS WITHOUT COMPLICATION, WITHOUT LONG-TERM CURRENT USE OF INSULIN (HCC): ICD-10-CM

## 2020-09-15 LAB
ALBUMIN SERPL-MCNC: 4.1 G/DL (ref 3.5–5)
ALBUMIN/GLOB SERPL: 1.2 {RATIO} (ref 1.1–2.2)
ALP SERPL-CCNC: 113 U/L (ref 45–117)
ALT SERPL-CCNC: 72 U/L (ref 12–78)
AST SERPL-CCNC: 34 U/L (ref 15–37)
BILIRUB DIRECT SERPL-MCNC: 0.2 MG/DL (ref 0–0.2)
BILIRUB SERPL-MCNC: 0.5 MG/DL (ref 0.2–1)
EST. AVERAGE GLUCOSE BLD GHB EST-MCNC: 140 MG/DL
GLOBULIN SER CALC-MCNC: 3.5 G/DL (ref 2–4)
HBA1C MFR BLD: 6.5 % (ref 4–5.6)
PROT SERPL-MCNC: 7.6 G/DL (ref 6.4–8.2)

## 2020-09-15 PROCEDURE — 3051F HG A1C>EQUAL 7.0%<8.0%: CPT | Performed by: FAMILY MEDICINE

## 2020-09-15 PROCEDURE — 99213 OFFICE O/P EST LOW 20 MIN: CPT | Performed by: FAMILY MEDICINE

## 2020-09-15 NOTE — PROGRESS NOTES
[facility]  [address] [facility], [address] [place] [address]  Dr. Joshua Anthony. Bernarda Corbett  Phone:  [phone]  Fax:  [phone]    Progress Note    Name:  Paresh Hall  Age:  60 y.o.  :  1955  Encounter Date:  9/15/2020    Primary Care Provider:  Shahana Patricio MD    Chief Complaint   Patient presents with    Follow-up     lab results     HPI:  Patient here to follow-up elevated LFT's and elevated A1C and determine medication refill and adjustments and appropriate lab evaluation. Patient is compliant with medications and no new side effects. She has lost 16 lbs since her lab work was done 3 months ago.        Past Medical History:   Diagnosis Date    Anxiety     Depression     Diabetes ([address]     type 2    Fatty liver  - no need for liver biopsy- Dr. Amanda Whitfield FHx: breast cancer     HTN (hypertension)     Hypothyroidism     Psoriasis     Varicose vein of leg      Past Surgical History:   Procedure Laterality Date    HX BREAST REDUCTION      HX CHOLECYSTECTOMY      HX HYSTERECTOMY      HX OOPHORECTOMY       Family History   Problem Relation Age of Onset    Breast Cancer Mother     COPD Mother     Alcohol abuse Father      Social History     Socioeconomic History    Marital status:      Spouse name: Not on file    Number of children: Not on file    Years of education: Not on file    Highest education level: Not on file   Tobacco Use    Smoking status: Former Smoker     Packs/day: 0.25     Types: Cigarettes     Last attempt to quit: 2018     Years since quittin.3    Smokeless tobacco: Never Used   Substance and Sexual Activity    Alcohol use: No     Frequency: Never    Drug use: No    Sexual activity: Never       Current Outpatient Medications   Medication Sig Dispense Refill    glucose blood VI test strips (OneTouch Ultra Blue Test Strip) strip Use one per day 100 Strip 3    levothyroxine (SYNTHROID) 75 mcg tablet TAKE 1 TABLET BY MOUTH EVERY DAY 90 Tab 3    metFORMIN (GLUCOPHAGE) 500 mg tablet Take 2 Tabs by mouth two (2) times daily (with meals). 360 Tab 3    lisinopriL (PRINIVIL, ZESTRIL) 5 mg tablet TAKE 1 TABLET BY MOUTH EVERY DAY 90 Tab 3    aspirin delayed-release 81 mg tablet Take  by mouth daily.  cholecalciferol, vitamin D3, (VITAMIN D3) 2,000 unit tab Take 400 Units by mouth.  magnesium 250 mg tab Take  by mouth.  grape seed extract 150 mg TbER Take  by mouth.  cyanocobalamin (VITAMIN B12) 100 mcg tablet Take 100 mcg by mouth daily.  B.infantis-B.ani-B.long-B.bifi (PROBIOTIC 4X) 10-15 mg TbEC Take  by mouth.  multivitamin, tx-iron-ca-min (THERA-M W/ IRON) 9 mg iron-400 mcg tab tablet Take 1 Tab by mouth daily. Allergies   Allergen Reactions    Penicillins Other (comments)     rash     Patient Active Problem List   Diagnosis Code    Other acute recurrent sinusitis J01.81    Diabetes ([facility] E11.9    Severe obesity ([facility] E66.01       Review of Systems   Constitutional: Negative for fever. Respiratory: Negative for shortness of breath. Cardiovascular: Negative for chest pain, orthopnea and PND. Gastrointestinal: Negative for abdominal pain, nausea and vomiting. Visit Vitals  BP (!) 121/58 (BP 1 Location: Left arm, BP Patient Position: Sitting)   Pulse (!) 50   Temp (!) 96.3 °F (35.7 °C) (Oral)   Resp 16   Ht 5' 7\" (1.702 m)   Wt 233 lb 6.4 oz (105.9 kg)   SpO2 97%   BMI 36.56 kg/m²     Physical Exam  Cardiovascular:      Rate and Rhythm: Normal rate and regular rhythm. Heart sounds: Normal heart sounds. Pulmonary:      Effort: Pulmonary effort is normal. No respiratory distress. Breath sounds: Normal breath sounds. No wheezing. Skin:     General: Skin is warm and dry. Neurological:      Mental Status: She is alert and oriented to person, place, and time. Labs/Radiology Reviewed    Assessment/Plan:    ICD-10-CM ICD-9-CM    1.  LFT elevation  R79.89 790.6 HEPATIC FUNCTION PANEL   2. Type 2 diabetes mellitus without complication, without long-term current use of insulin (HCC)  E11.9 250.00 HEMOGLOBIN A1C WITH EAG       Orders Placed This Encounter    HEMOGLOBIN A1C WITH EAG    HEPATIC FUNCTION PANEL       Follow-up and Dispositions    · Return in about 3 months (around 12/15/2020) for for diabetes follow up and LFT's. .       Pt's main treatment plan is to lose 40 more lbs if he wants to avoid knee surgery if that is possible. Pt will continue with present medication and follow up with me in 3-4 months.       Fern Jackman MD  9/15/2020

## 2020-09-15 NOTE — PROGRESS NOTES
Identified pt with two pt identifiers(name and ). Reviewed record in preparation for visit and have obtained necessary documentation. Chief Complaint   Patient presents with    Follow-up     lab results        Health Maintenance Due   Topic    Pneumococcal 0-64 years (1 of 1 - PPSV23)    Foot Exam Q1     Eye Exam Retinal or Dilated     DTaP/Tdap/Td series (1 - Tdap)    PAP AKA CERVICAL CYTOLOGY     Breast Cancer Screen Mammogram     Shingrix Vaccine Age 50> (1 of 2)    FOBT Q1Y Age 54-65     Flu Vaccine (1)       Visit Vitals  Blood Pressure (Abnormal) 121/58 (BP 1 Location: Left arm, BP Patient Position: Sitting)   Pulse (Abnormal) 50   Temperature (Abnormal) 96.3 °F (35.7 °C) (Oral)   Respiration 16   Height 5' 7\" (1.702 m)   Weight 233 lb 6.4 oz (105.9 kg)   Oxygen Saturation 97%   Body Mass Index 36.56 kg/m²         Coordination of Care Questionnaire:  :   1) Have you been to an emergency room, urgent care, or hospitalized since your last visit? NO      2. Have seen or consulted any other health care provider since your last visit? NO          Patient is accompanied by  I have received verbal consent from Zully Williamson to discuss any/all medical information while they are present in the room.

## 2020-11-10 ENCOUNTER — TELEPHONE (OUTPATIENT)
Dept: FAMILY MEDICINE CLINIC | Age: 65
End: 2020-11-10

## 2020-11-16 DIAGNOSIS — R79.89 LFT ELEVATION: ICD-10-CM

## 2020-11-16 DIAGNOSIS — E11.9 TYPE 2 DIABETES MELLITUS WITHOUT COMPLICATION, WITHOUT LONG-TERM CURRENT USE OF INSULIN (HCC): Primary | ICD-10-CM

## 2020-12-16 ENCOUNTER — TELEPHONE (OUTPATIENT)
Dept: FAMILY MEDICINE CLINIC | Age: 65
End: 2020-12-16

## 2020-12-16 NOTE — TELEPHONE ENCOUNTER
Please advise      ----- Message from Jadon Eason sent at 12/16/2020  1:39 PM EST -----  Regarding: Dr. Rosas Welch first and last name: N/A  Reason for call: Speak to Dr. Mina Stern or his nurse  Callback required yes/no and why: yes  Best contact number(s): [phone]  Details to clarify the request: N/A

## 2020-12-18 ENCOUNTER — TELEPHONE (OUTPATIENT)
Dept: FAMILY MEDICINE CLINIC | Age: 65
End: 2020-12-18

## 2021-02-05 ENCOUNTER — TELEPHONE (OUTPATIENT)
Dept: FAMILY MEDICINE CLINIC | Age: 66
End: 2021-02-05

## 2021-02-05 NOTE — TELEPHONE ENCOUNTER
----- Message from Hubert Braun sent at 2/5/2021 10:29 AM EST -----  Regarding: Dr. Dalia Sood Message/Vendor Calls    Caller's first and last name: n/a      Reason for call: Wants to now if an order for labs will be called in prior to appt because the Dr likes to see blood work results before her follow up      Callback required yes/no and why: yes      Best contact number(s): [phone]      Details to clarify the request: n/a      Hubert Braun

## 2021-03-09 ENCOUNTER — VIRTUAL VISIT (OUTPATIENT)
Dept: FAMILY MEDICINE CLINIC | Age: 66
End: 2021-03-09
Payer: COMMERCIAL

## 2021-03-09 DIAGNOSIS — R79.89 LFT ELEVATION: Primary | ICD-10-CM

## 2021-03-09 DIAGNOSIS — I10 HYPERTENSION, UNSPECIFIED TYPE: ICD-10-CM

## 2021-03-09 DIAGNOSIS — K76.0 FATTY LIVER: ICD-10-CM

## 2021-03-09 DIAGNOSIS — E11.9 TYPE 2 DIABETES MELLITUS WITHOUT COMPLICATION, WITHOUT LONG-TERM CURRENT USE OF INSULIN (HCC): ICD-10-CM

## 2021-03-09 DIAGNOSIS — E03.9 ACQUIRED HYPOTHYROIDISM: ICD-10-CM

## 2021-03-09 PROCEDURE — 99213 OFFICE O/P EST LOW 20 MIN: CPT | Performed by: FAMILY MEDICINE

## 2021-03-09 RX ORDER — METFORMIN HYDROCHLORIDE 500 MG/1
1000 TABLET ORAL 2 TIMES DAILY WITH MEALS
Qty: 360 TAB | Refills: 3 | Status: SHIPPED | OUTPATIENT
Start: 2021-03-09 | End: 2021-07-18

## 2021-03-09 RX ORDER — LEVOTHYROXINE SODIUM 75 UG/1
TABLET ORAL
Qty: 90 TAB | Refills: 3 | Status: SHIPPED | OUTPATIENT
Start: 2021-03-09 | End: 2021-08-23 | Stop reason: SDUPTHER

## 2021-03-09 RX ORDER — LISINOPRIL 5 MG/1
TABLET ORAL
Qty: 90 TAB | Refills: 3 | Status: SHIPPED | OUTPATIENT
Start: 2021-03-09 | End: 2021-08-22

## 2021-03-09 NOTE — PROGRESS NOTES
Sirena Olivarez is a 60 y.o. female who was seen by synchronous (real-time) audio-video technology on 3/9/2021 for Follow-up and Results        Assessment & Plan:   Doing well. No changes but weight loss to continue. ICD-10-CM ICD-9-CM    1. LFT elevation  R79.89 790.6 HEPATIC FUNCTION PANEL   2. Acquired hypothyroidism  E03.9 244.9 levothyroxine (SYNTHROID) 75 mcg tablet   3. Type 2 diabetes mellitus without complication, without long-term current use of insulin (HCC)  E11.9 250.00 HEMOGLOBIN A1C WITH EAG      metFORMIN (GLUCOPHAGE) 500 mg tablet   4. Hypertension, unspecified type  I10 401.9 lisinopriL (PRINIVIL, ZESTRIL) 5 mg tablet     Orders Placed This Encounter    HEMOGLOBIN A1C WITH EAG     Standing Status:   Future     Standing Expiration Date:   3/9/2022    HEPATIC FUNCTION PANEL     Standing Status:   Future     Standing Expiration Date:   3/9/2022    loratadine/pseudoephedrine (CLARITIN-D 24 HOUR PO)     Sig: Take  by mouth.  triamcinolone acetonide (NASACORT NA)     Sig: by Nasal route.  levothyroxine (SYNTHROID) 75 mcg tablet     Sig: TAKE 1 TABLET BY MOUTH EVERY DAY     Dispense:  90 Tab     Refill:  3    metFORMIN (GLUCOPHAGE) 500 mg tablet     Sig: Take 2 Tabs by mouth two (2) times daily (with meals). Dispense:  360 Tab     Refill:  3    lisinopriL (PRINIVIL, ZESTRIL) 5 mg tablet     Sig: TAKE 1 TABLET BY MOUTH EVERY DAY     Dispense:  90 Tab     Refill:  3     Follow-up and Dispositions    · Return in about 6 months (around 9/9/2021) for for diabetes follow up, LFT F/U. Subjective:   Doing well, walking in park. A1c is 6.3 and LFT's good. Weight at 228-230. Life change is consistent. Prior to Admission medications    Medication Sig Start Date End Date Taking? Authorizing Provider   loratadine/pseudoephedrine (CLARITIN-D 24 HOUR PO) Take  by mouth. Yes Provider, Historical   triamcinolone acetonide (NASACORT NA) by Nasal route.    Yes Provider, Historical levothyroxine (SYNTHROID) 75 mcg tablet TAKE 1 TABLET BY MOUTH EVERY DAY 5/19/20  Yes Param Adair MD   metFORMIN (GLUCOPHAGE) 500 mg tablet Take 2 Tabs by mouth two (2) times daily (with meals). 5/19/20  Yes Param Adair MD   lisinopriL (PRINIVIL, ZESTRIL) 5 mg tablet TAKE 1 TABLET BY MOUTH EVERY DAY 5/19/20  Yes Param Adair MD   aspirin delayed-release 81 mg tablet Take  by mouth daily. Yes Provider, Historical   cholecalciferol, vitamin D3, (VITAMIN D3) 2,000 unit tab Take 400 Units by mouth. Yes Provider, Historical   grape seed extract 150 mg TbER Take  by mouth. Yes Provider, Historical   cyanocobalamin (VITAMIN B12) 100 mcg tablet Take 100 mcg by mouth daily. Yes Provider, Historical   multivitamin, tx-iron-ca-min (THERA-M W/ IRON) 9 mg iron-400 mcg tab tablet Take 1 Tab by mouth daily. 1/2 tablet daily   Yes Provider, Historical   B.infantis-B.ani-B.long-B.bifi (PROBIOTIC 4X) 10-15 mg TbEC Take  by mouth. Yes Provider, Historical   glucose blood VI test strips (OneTouch Ultra Blue Test Strip) strip Use one per day 5/19/20   Param Adair MD   magnesium 250 mg tab Take  by mouth. Provider, Historical         Review of Systems   Constitutional: Negative for fever. Respiratory: Negative for shortness of breath. Cardiovascular: Negative for chest pain, orthopnea and PND. Gastrointestinal: Negative for abdominal pain, nausea and vomiting.        Objective:     Patient-Reported Vitals 3/9/2021   Patient-Reported Weight 231lb        [  Mental status: [x] Alert and awake  [x] Oriented to person/place/time [x] Able to follow commands    [] Abnormal -     Eyes:   EOM    [x]  Normal    [] Abnormal -   Sclera  [x]  Normal    [] Abnormal -          Discharge [x]  None visible   [] Abnormal -     HENT: [x] Normocephalic, atraumatic  [] Abnormal -   [x] Mouth/Throat: Mucous membranes are moist    External Ears [x] Normal  [] Abnormal -    Neck: [x] No visualized mass [] Abnormal -     Pulmonary/Chest: [x] Respiratory effort normal   [x] No visualized signs of difficulty breathing or respiratory distress        [] Abnormal -      Musculoskeletal:   [x] Normal gait with no signs of ataxia         [x] Normal range of motion of neck        [] Abnormal -     Neurological:        [x] No Facial Asymmetry (Cranial nerve 7 motor function) (limited exam due to video visit)          [x] No gaze palsy        [] Abnormal -          Skin:        [] No significant exanthematous lesions or discoloration noted on facial skin         [] Abnormal -            Psychiatric:       [x] Normal Affect [] Abnormal -        [] No Hallucinations    Other pertinent observable physical exam findings:-        We discussed the expected course, resolution and complications of the diagnosis(es) in detail. Medication risks, benefits, costs, interactions, and alternatives were discussed as indicated. I advised her to contact the office if her condition worsens, changes or fails to improve as anticipated. She expressed understanding with the diagnosis(es) and plan. Cecilio Motley, was evaluated through a synchronous (real-time) audio-video encounter. The patient (or guardian if applicable) is aware that this is a billable service. Verbal consent to proceed has been obtained within the past 12 months. The visit was conducted pursuant to the emergency declaration under the [facility], [address] [place] [address] authority and the [employer] Act. Patient identification was verified, and a caregiver was present when appropriate. The patient was located in a state where the provider was credentialed to provide care.       Jennifer Easton MD

## 2021-03-09 NOTE — PROGRESS NOTES
1. Have you been to the ER, urgent care clinic since your last visit? Hospitalized since your last visit? No    2. Have you seen or consulted any other health care providers outside of the [address] [place] [address] since your last visit? Include any pap smears or colon screening.  No     Chief Complaint   Patient presents with    Follow-up    Results     Health Maintenance Due   Topic Date Due    Pneumococcal 0-64 years (1 of 1 - PPSV23) Never done    Foot Exam Q1  Never done    Eye Exam Retinal or Dilated  Never done    COVID-19 Vaccine (1 of 2) Never done    DTaP/Tdap/Td series (1 - Tdap) Never done    PAP AKA CERVICAL CYTOLOGY  Never done    Breast Cancer Screen Mammogram  Never done    Shingrix Vaccine Age 50> (1 of 2) Never done    Colorectal Cancer Screening Combo  Never done    Flu Vaccine (1) Never done    Bone Densitometry (Dexa) Screening  05/01/2021     Patient-Reported Vitals 3/9/2021   Patient-Reported Weight 231lb        3 most recent PHQ Screens 3/9/2021   Little interest or pleasure in doing things Several days   Feeling down, depressed, irritable, or hopeless Several days   Total Score PHQ 2 2     Pharmacy verified:   [id number] - [address], [place] - [address] AT [facility]

## 2021-07-17 DIAGNOSIS — E11.9 TYPE 2 DIABETES MELLITUS WITHOUT COMPLICATION, WITHOUT LONG-TERM CURRENT USE OF INSULIN (HCC): ICD-10-CM

## 2021-07-18 RX ORDER — METFORMIN HYDROCHLORIDE 500 MG/1
1000 TABLET ORAL 2 TIMES DAILY WITH MEALS
Qty: 360 TABLET | Refills: 3 | Status: SHIPPED | OUTPATIENT
Start: 2021-07-18 | End: 2021-08-23 | Stop reason: SDUPTHER

## 2021-08-03 ENCOUNTER — TELEPHONE (OUTPATIENT)
Dept: FAMILY MEDICINE CLINIC | Age: 66
End: 2021-08-03

## 2021-08-03 NOTE — TELEPHONE ENCOUNTER
----- Message from [facility] sent at 8/3/2021  4:15 PM EDT -----  Regarding: Dr. Alesia Gallagher Message/Vendor Calls    Caller's first and last name: Pt      Reason for call: Pt needs an order for bloodwork to be done prior to her appt      Callback required yes/no and why: Yes      Best contact number(s): [phone]      Details to clarify the request: [address] [place] [address]

## 2021-08-05 DIAGNOSIS — R79.89 LFT ELEVATION: ICD-10-CM

## 2021-08-05 DIAGNOSIS — K76.0 FATTY LIVER: Primary | ICD-10-CM

## 2021-08-05 DIAGNOSIS — E11.9 TYPE 2 DIABETES MELLITUS WITHOUT COMPLICATION, WITHOUT LONG-TERM CURRENT USE OF INSULIN (HCC): ICD-10-CM

## 2021-08-16 LAB
ALBUMIN SERPL-MCNC: 4.1 G/DL (ref 3.5–5)
ALBUMIN/GLOB SERPL: 1.2 {RATIO} (ref 1.1–2.2)
ALP SERPL-CCNC: 145 U/L (ref 45–117)
ALT SERPL-CCNC: 47 U/L (ref 12–78)
AST SERPL-CCNC: 16 U/L (ref 15–37)
BILIRUB DIRECT SERPL-MCNC: 0.2 MG/DL (ref 0–0.2)
BILIRUB SERPL-MCNC: 0.4 MG/DL (ref 0.2–1)
EST. AVERAGE GLUCOSE BLD GHB EST-MCNC: 143 MG/DL
GLOBULIN SER CALC-MCNC: 3.4 G/DL (ref 2–4)
HBA1C MFR BLD: 6.6 % (ref 4–5.6)
PROT SERPL-MCNC: 7.5 G/DL (ref 6.4–8.2)

## 2021-08-20 DIAGNOSIS — E11.9 TYPE 2 DIABETES MELLITUS WITHOUT COMPLICATION, WITHOUT LONG-TERM CURRENT USE OF INSULIN (HCC): ICD-10-CM

## 2021-08-20 DIAGNOSIS — I10 HYPERTENSION, UNSPECIFIED TYPE: ICD-10-CM

## 2021-08-22 RX ORDER — BLOOD SUGAR DIAGNOSTIC
STRIP MISCELLANEOUS
Qty: 100 STRIP | Refills: 3 | Status: SHIPPED | OUTPATIENT
Start: 2021-08-22

## 2021-08-22 RX ORDER — LISINOPRIL 5 MG/1
TABLET ORAL
Qty: 90 TABLET | Refills: 3 | Status: SHIPPED | OUTPATIENT
Start: 2021-08-22

## 2021-08-23 ENCOUNTER — OFFICE VISIT (OUTPATIENT)
Dept: FAMILY MEDICINE CLINIC | Age: 66
End: 2021-08-23
Payer: MEDICARE

## 2021-08-23 VITALS
RESPIRATION RATE: 20 BRPM | HEIGHT: 67 IN | TEMPERATURE: 97.1 F | OXYGEN SATURATION: 97 % | WEIGHT: 233.6 LBS | SYSTOLIC BLOOD PRESSURE: 132 MMHG | BODY MASS INDEX: 36.66 KG/M2 | HEART RATE: 79 BPM | DIASTOLIC BLOOD PRESSURE: 79 MMHG

## 2021-08-23 DIAGNOSIS — E66.01 SEVERE OBESITY (BMI 35.0-35.9 WITH COMORBIDITY) (HCC): ICD-10-CM

## 2021-08-23 DIAGNOSIS — E11.9 TYPE 2 DIABETES MELLITUS WITHOUT COMPLICATION, WITHOUT LONG-TERM CURRENT USE OF INSULIN (HCC): ICD-10-CM

## 2021-08-23 DIAGNOSIS — E03.9 ACQUIRED HYPOTHYROIDISM: ICD-10-CM

## 2021-08-23 DIAGNOSIS — L71.9 ROSACEA: Primary | ICD-10-CM

## 2021-08-23 PROCEDURE — 99213 OFFICE O/P EST LOW 20 MIN: CPT | Performed by: FAMILY MEDICINE

## 2021-08-23 RX ORDER — LEVOTHYROXINE SODIUM 75 UG/1
TABLET ORAL
Qty: 90 TABLET | Refills: 3 | Status: SHIPPED | OUTPATIENT
Start: 2021-08-23

## 2021-08-23 RX ORDER — METFORMIN HYDROCHLORIDE 500 MG/1
1000 TABLET ORAL 2 TIMES DAILY WITH MEALS
Qty: 360 TABLET | Refills: 3 | Status: SHIPPED | OUTPATIENT
Start: 2021-08-23 | End: 2022-05-24 | Stop reason: SDUPTHER

## 2021-08-23 RX ORDER — METRONIDAZOLE 7.5 MG/G
GEL TOPICAL 2 TIMES DAILY
Qty: 60 G | Refills: 1 | Status: SHIPPED | OUTPATIENT
Start: 2021-08-23

## 2021-08-23 NOTE — PROGRESS NOTES
1. Have you been to the ER, urgent care clinic since your last visit? No    Hospitalized since your last visit? No      2. Have you seen or consulted any other health care providers outside of the [address] [place] [address] since your last visit?   no  Include any pap smears or colon screening.

## 2021-08-23 NOTE — PROGRESS NOTES
[facility]  [address] , [address] [facility], [address] [place] [address]  Dr. Keven Lozano. Ankur Marcano  Phone:  [phone]  Fax:  [phone]    Progress Note    Name:  Isa Brito  Age:  73 y.o.  :  1955  Encounter Date:  2021    Primary Care Provider:  Tami Davidson MD    Chief Complaint   Patient presents with    Follow-up     pt states liver enzymes     HPI:  Patient here to follow-up LFT's and DM  and determine medication refill and adjustments and appropriate lab evaluation. Patient is compliant with medications and no new side effects.        Past Medical History:   Diagnosis Date    Anxiety     Depression     Diabetes ([address]     type 2    Fatty liver  - no need for liver biopsy- Dr. Eddie Mtz FHx: breast cancer     HTN (hypertension)     Hypothyroidism     Psoriasis     Varicose vein of leg      Past Surgical History:   Procedure Laterality Date    HX BREAST REDUCTION      HX CHOLECYSTECTOMY      HX HEENT      extensive dental work and infected parotid glands    HX HYSTERECTOMY      HX OOPHORECTOMY       Family History   Problem Relation Age of Onset    Breast Cancer Mother     COPD Mother     Alcohol abuse Father      Social History     Socioeconomic History    Marital status:      Spouse name: Not on file    Number of children: Not on file    Years of education: Not on file    Highest education level: Not on file   Tobacco Use    Smoking status: Former Smoker     Packs/day: 0.25     Types: Cigarettes     Quit date: 2018     Years since quitting: 3.3    Smokeless tobacco: Never Used   Vaping Use    Vaping Use: Never used   Substance and Sexual Activity    Alcohol use: No    Drug use: No    Sexual activity: Never     Social Determinants of Health     Financial Resource Strain:     Difficulty of Paying Living Expenses:    Food Insecurity:     Worried About Running Out of Food in the Last Year:     [place] of Food in the Last Year: Transportation Needs:     Lack of Transportation (Medical):  Lack of Transportation (Non-Medical):    Physical Activity:     Days of Exercise per Week:     Minutes of Exercise per Session:    Stress:     Feeling of Stress :    Social Connections:     Frequency of Communication with Friends and Family:     Frequency of Social Gatherings with Friends and Family:     Attends Buddhism Services:     Active Member of Clubs or Organizations:     Attends Club or Organization Meetings:     Marital Status:        Current Outpatient Medications   Medication Sig Dispense Refill    metFORMIN (GLUCOPHAGE) 500 mg tablet Take 2 Tablets by mouth two (2) times daily (with meals). 360 Tablet 3    levothyroxine (SYNTHROID) 75 mcg tablet TAKE 1 TABLET BY MOUTH EVERY DAY 90 Tablet 3    metroNIDAZOLE (METROGEL) 0.75 % topical gel Apply  to affected area two (2) times a day. 60 g 1    lisinopriL (PRINIVIL, ZESTRIL) 5 mg tablet TAKE 1 TABLET BY MOUTH EVERY DAY 90 Tablet 3    glucose blood VI test strips (OneTouch Ultra Test) strip USE ONE PER  Strip 3    loratadine/pseudoephedrine (CLARITIN-D 24 HOUR PO) Take  by mouth.  aspirin delayed-release 81 mg tablet Take  by mouth daily.  cholecalciferol, vitamin D3, (VITAMIN D3) 2,000 unit tab Take 400 Units by mouth.  grape seed extract 150 mg TbER Take  by mouth.  cyanocobalamin (VITAMIN B12) 100 mcg tablet Take 100 mcg by mouth daily.  multivitamin, tx-iron-ca-min (THERA-M W/ IRON) 9 mg iron-400 mcg tab tablet Take 1 Tab by mouth daily. 1/2 tablet daily      B.infantis-B.ani-B.long-B.bifi (PROBIOTIC 4X) 10-15 mg TbEC Take  by mouth.  triamcinolone acetonide (NASACORT NA) by Nasal route. (Patient not taking: Reported on 8/23/2021)      magnesium 250 mg tab Take  by mouth.  (Patient not taking: Reported on 8/23/2021)       Allergies   Allergen Reactions    Penicillins Other (comments)     rash    Tape [Adhesive] Other (comments) Skin peeling    Triple Antibiotic [Zblym-Mdpst-Jlyhudt-Pramoxine] Other (comments)     redness     Patient Active Problem List   Diagnosis Code    Other acute recurrent sinusitis J01.81    Diabetes ([facility] [address] E11.9    Severe obesity ([facility] [address] E66.01    Fatty liver K76.0       Review of Systems   Constitutional: Negative for fever. Respiratory: Negative for shortness of breath. Cardiovascular: Negative for chest pain, orthopnea and PND. Gastrointestinal: Negative for abdominal pain, nausea and vomiting. Visit Vitals  /79 (BP 1 Location: Left upper arm, BP Patient Position: Sitting, BP Cuff Size: Large adult)   Pulse 79   Temp 97.1 °F (36.2 °C) (Temporal)   Resp 20   Ht 5' 7\" (1.702 m)   Wt 233 lb 9.6 oz (106 kg)   SpO2 97%   BMI 36.59 kg/m²     Physical Exam  Cardiovascular:      Rate and Rhythm: Normal rate and regular rhythm. Heart sounds: Normal heart sounds. Pulmonary:      Effort: Pulmonary effort is normal. No respiratory distress. Breath sounds: Normal breath sounds. No wheezing. Skin:     General: Skin is warm and dry. Neurological:      Mental Status: She is alert and oriented to person, place, and time. Labs/Radiology Reviewed    Assessment/Plan:    ICD-10-CM ICD-9-CM    1. Rosacea  L71.9 695.3 metroNIDAZOLE (METROGEL) 0.75 % topical gel   2. Type 2 diabetes mellitus without complication, without long-term current use of insulin ([facility])  E11.9 250.00 metFORMIN (GLUCOPHAGE) 500 mg tablet   3. Severe obesity (BMI 35.0-35.9 with comorbidity) ([facility])  E66.01 278.01     Z68.35 V85.35    4. Acquired hypothyroidism  E03.9 244.9 levothyroxine (SYNTHROID) 75 mcg tablet       Pt is doing well and no changes needed. Follow-up and Dispositions    · Return in about 6 months (around 2/23/2022) for annual exam with lab work.    Follow-up and Disposition History            Steven Kan MD  8/23/2021

## 2021-11-03 ENCOUNTER — OFFICE VISIT (OUTPATIENT)
Dept: FAMILY MEDICINE CLINIC | Age: 66
End: 2021-11-03
Payer: MEDICARE

## 2021-11-03 VITALS
WEIGHT: 233 LBS | HEART RATE: 89 BPM | OXYGEN SATURATION: 99 % | TEMPERATURE: 98.2 F | RESPIRATION RATE: 20 BRPM | BODY MASS INDEX: 36.57 KG/M2 | SYSTOLIC BLOOD PRESSURE: 130 MMHG | HEIGHT: 67 IN | DIASTOLIC BLOOD PRESSURE: 84 MMHG

## 2021-11-03 DIAGNOSIS — L02.92 BOIL: Primary | ICD-10-CM

## 2021-11-03 PROCEDURE — G8400 PT W/DXA NO RESULTS DOC: HCPCS | Performed by: FAMILY MEDICINE

## 2021-11-03 PROCEDURE — 99213 OFFICE O/P EST LOW 20 MIN: CPT | Performed by: FAMILY MEDICINE

## 2021-11-03 PROCEDURE — G8536 NO DOC ELDER MAL SCRN: HCPCS | Performed by: FAMILY MEDICINE

## 2021-11-03 PROCEDURE — G8754 DIAS BP LESS 90: HCPCS | Performed by: FAMILY MEDICINE

## 2021-11-03 PROCEDURE — G8752 SYS BP LESS 140: HCPCS | Performed by: FAMILY MEDICINE

## 2021-11-03 PROCEDURE — G8417 CALC BMI ABV UP PARAM F/U: HCPCS | Performed by: FAMILY MEDICINE

## 2021-11-03 PROCEDURE — 3017F COLORECTAL CA SCREEN DOC REV: CPT | Performed by: FAMILY MEDICINE

## 2021-11-03 PROCEDURE — 1090F PRES/ABSN URINE INCON ASSESS: CPT | Performed by: FAMILY MEDICINE

## 2021-11-03 PROCEDURE — 1101F PT FALLS ASSESS-DOCD LE1/YR: CPT | Performed by: FAMILY MEDICINE

## 2021-11-03 PROCEDURE — G8510 SCR DEP NEG, NO PLAN REQD: HCPCS | Performed by: FAMILY MEDICINE

## 2021-11-03 PROCEDURE — G8427 DOCREV CUR MEDS BY ELIG CLIN: HCPCS | Performed by: FAMILY MEDICINE

## 2021-11-03 RX ORDER — DOXYCYCLINE 100 MG/1
100 CAPSULE ORAL 2 TIMES DAILY
Qty: 20 CAPSULE | Refills: 0 | Status: SHIPPED | OUTPATIENT
Start: 2021-11-03 | End: 2021-11-10 | Stop reason: SDUPTHER

## 2021-11-03 RX ORDER — NYSTATIN 100000 [USP'U]/G
POWDER TOPICAL 4 TIMES DAILY
Qty: 60 G | Refills: 3 | Status: SHIPPED | OUTPATIENT
Start: 2021-11-03

## 2021-11-03 NOTE — PROGRESS NOTES
Patient stated name &     Chief Complaint   Patient presents with    Other     Left breast abscess     Started about 3 days ago        Health Maintenance Due   Topic    Foot Exam Q1     Eye Exam Retinal or Dilated     DTaP/Tdap/Td series (1 - Tdap)    Cervical cancer screen     Breast Cancer Screen Mammogram     Colorectal Cancer Screening Combo     Shingrix Vaccine Age 50> (1 of 2)    Bone Densitometry (Dexa) Screening     Pneumococcal 65+ years (1 of 1 - PPSV23)    MICROALBUMIN Q1     Lipid Screen     Medicare Yearly Exam     Flu Vaccine (1)       Wt Readings from Last 3 Encounters:   21 233 lb (105.7 kg)   21 233 lb 9.6 oz (106 kg)   09/15/20 233 lb 6.4 oz (105.9 kg)     Temp Readings from Last 3 Encounters:   21 98.2 °F (36.8 °C) (Temporal)   21 97.1 °F (36.2 °C) (Temporal)   09/15/20 (!) 96.3 °F (35.7 °C) (Oral)     BP Readings from Last 3 Encounters:   21 130/84   21 132/79   09/15/20 (!) 121/58     Pulse Readings from Last 3 Encounters:   21 89   21 79   09/15/20 (!) 50         Learning Assessment:  :     Learning Assessment 2018   PRIMARY LEARNER Patient   PRIMARY LANGUAGE ENGLISH   LEARNER PREFERENCE PRIMARY DEMONSTRATION   ANSWERED BY patient   RELATIONSHIP SELF       Depression Screening:  :     3 most recent PHQ Screens 11/3/2021   Little interest or pleasure in doing things Not at all   Feeling down, depressed, irritable, or hopeless Not at all   Total Score PHQ 2 0       Fall Risk Assessment:  :     Fall Risk Assessment, last 12 mths 11/3/2021   Able to walk? Yes   Fall in past 12 months? 0   Do you feel unsteady? 0   Are you worried about falling 0       Abuse Screening:  :     Abuse Screening Questionnaire 2018   Do you ever feel afraid of your partner? N   Are you in a relationship with someone who physically or mentally threatens you? N   Is it safe for you to go home?  Y       Coordination of Care Questionnaire:  :     1) Have you been to an emergency room, urgent care clinic since your last visit? no   Hospitalized since your last visit? no             2) Have you seen or consulted any other health care providers outside of [address] [place] [address] since your last visit? no  (Include any pap smears or colon screenings in this section.)    3) Do you have an Advance Directive on file? no  Are you interested in receiving information about Advance Directives? no    Patient is accompanied by self I have received verbal consent from Leonardo Gonsales to discuss any/all medical information while they are present in the room.

## 2021-11-03 NOTE — PROGRESS NOTES
11/3/2021   Magdalene Davidson (: 1955) is a 73 y.o. female, established patient, here for evaluation of the following chief complaint(s): Other (Left breast abscess     Started about 3 days ago)     ASSESSMENT/PLAN:  Below is the assessment and plan developed based on review of pertinent history, physical exam, labs, studies, and medications. 1. Boil  -     nystatin (MYCOSTATIN) powder; Apply  to affected area four (4) times daily. , Normal, Disp-60 g, R-3  -     doxycycline (VIBRAMYCIN) 100 mg capsule; Take 1 Capsule by mouth two (2) times a day for 10 days. , Normal, Disp-20 Capsule, R-0    Return in about 2 weeks (around 2021) for follow up. SUBJECTIVE/OBJECTIVE:  HPI   1. Boil  Patient reports boil on left breast.  Has rash and erythema underneath left breast.  The boil is tender and erythematous. It has a central punctum. It is not draining at this time. Patient has been using OTC antibiotic ointment without relief. Denies fever or chills. No results found for any visits on 21. Review of Systems   Constitutional: Negative. HENT: Negative. Eyes: Negative. Respiratory: Negative. Cardiovascular: Negative. Gastrointestinal: Negative. Genitourinary: Negative. Musculoskeletal: Negative. Skin: Positive for rash. Neurological: Negative. Endo/Heme/Allergies: Negative. Psychiatric/Behavioral: Negative. Physical Exam  Vitals and nursing note reviewed. HENT:      Head: Normocephalic and atraumatic. Right Ear: External ear normal.      Left Ear: External ear normal.      Nose: Nose normal.   Eyes:      Conjunctiva/sclera: Conjunctivae normal.   Cardiovascular:      Rate and Rhythm: Normal rate and regular rhythm. Pulmonary:      Effort: Pulmonary effort is normal.      Breath sounds: Normal breath sounds. Chest:       Abdominal:      General: Bowel sounds are normal. There is no distension. Palpations: Abdomen is soft. Tenderness: There is no abdominal tenderness. Musculoskeletal:         General: Normal range of motion. Cervical back: Normal range of motion and neck supple. Skin:     General: Skin is warm and dry. Findings: Abscess present. Neurological:      Mental Status: She is alert. /84 (BP 1 Location: Right arm, BP Patient Position: Sitting, BP Cuff Size: Adult)   Pulse 89   Temp 98.2 °F (36.8 °C) (Temporal)   Resp 20   Ht 5' 7\" (1.702 m)   Wt 233 lb (105.7 kg)   SpO2 99%   BMI 36.49 kg/m²     Allergies   Allergen Reactions    Penicillins Other (comments)     rash    Tape [Adhesive] Other (comments)     Skin peeling    Triple Antibiotic [Xlqyi-Zcxmn-Xwgigzn-Pramoxine] Other (comments)     redness       Current Outpatient Medications   Medication Sig    nystatin (MYCOSTATIN) powder Apply  to affected area four (4) times daily.  doxycycline (VIBRAMYCIN) 100 mg capsule Take 1 Capsule by mouth two (2) times a day for 10 days.  metFORMIN (GLUCOPHAGE) 500 mg tablet Take 2 Tablets by mouth two (2) times daily (with meals).  levothyroxine (SYNTHROID) 75 mcg tablet TAKE 1 TABLET BY MOUTH EVERY DAY    metroNIDAZOLE (METROGEL) 0.75 % topical gel Apply  to affected area two (2) times a day.  lisinopriL (PRINIVIL, ZESTRIL) 5 mg tablet TAKE 1 TABLET BY MOUTH EVERY DAY    loratadine/pseudoephedrine (CLARITIN-D 24 HOUR PO) Take  by mouth.  aspirin delayed-release 81 mg tablet Take  by mouth daily.  cholecalciferol, vitamin D3, (VITAMIN D3) 2,000 unit tab Take 400 Units by mouth.  grape seed extract 150 mg TbER Take  by mouth.  cyanocobalamin (VITAMIN B12) 100 mcg tablet Take 100 mcg by mouth daily.  multivitamin, tx-iron-ca-min (THERA-M W/ IRON) 9 mg iron-400 mcg tab tablet Take 1 Tab by mouth daily. 1/2 tablet daily    B.infantis-B.ani-B.long-B.bifi (PROBIOTIC 4X) 10-15 mg TbEC Take  by mouth.     glucose blood VI test strips (OneTouch Ultra Test) strip USE ONE PER DAY (Patient not taking: Reported on 11/3/2021)    triamcinolone acetonide (NASACORT NA) by Nasal route. (Patient not taking: Reported on 8/23/2021)    magnesium 250 mg tab Take  by mouth. (Patient not taking: Reported on 11/3/2021)     No current facility-administered medications for this visit. Past Medical History:   Diagnosis Date    Anxiety     Depression     Diabetes ([address]     type 2    Fatty liver 2020 - no need for liver biopsy- Dr. Everardo Elder FHx: breast cancer     HTN (hypertension)     Hypothyroidism     Psoriasis     Varicose vein of leg        Past Surgical History:   Procedure Laterality Date    HX BREAST REDUCTION      HX CHOLECYSTECTOMY      HX HEENT      extensive dental work and infected parotid glands    HX HYSTERECTOMY      HX OOPHORECTOMY         Social History:  reports that she quit smoking about 3 years ago. Her smoking use included cigarettes. She smoked 0.25 packs per day. She has never used smokeless tobacco. She reports that she does not drink alcohol and does not use drugs. Patient Care Team:  Anshu Lacy MD as PCP - General (Family Medicine)    Problem List  Date Reviewed: 12/21/2020          Codes Class Noted    Fatty liver ICD-10-CM: K76.0  ICD-9-CM: 571.8  Unknown        Severe obesity ([address] ICD-10-CM: E66.01  ICD-9-CM: 278.01  6/11/2019        Other acute recurrent sinusitis ICD-10-CM: J01.81  ICD-9-CM: 461.9  12/5/2018        Diabetes ([facility] [address] ICD-10-CM: E11.9  ICD-9-CM: 250.00  Unknown    Overview Signed 12/5/2018 10:11 AM by Anshu Lacy MD     type 2                        I ADVISED PATIENT TO GO TO ER IF SYMPTOMS WORSEN , CHANGE OR FAILS TO IMPROVE. I have discussed the diagnosis with the patient and the intended plan as seen in the above orders. The patient has received an after-visit summary and questions were answered concerning future plans. I have discussed medication side effects and warnings with the patient as well.  The patient agrees and understands above plan. An electronic signature was used to authenticate this note.   -- Manohar Olivia MD

## 2022-03-19 PROBLEM — E66.01 SEVERE OBESITY (HCC): Status: ACTIVE | Noted: 2019-06-11

## 2022-03-19 PROBLEM — J01.81 OTHER ACUTE RECURRENT SINUSITIS: Status: ACTIVE | Noted: 2018-12-05

## 2022-05-24 DIAGNOSIS — E11.9 TYPE 2 DIABETES MELLITUS WITHOUT COMPLICATION, WITHOUT LONG-TERM CURRENT USE OF INSULIN (HCC): ICD-10-CM

## 2022-05-24 NOTE — TELEPHONE ENCOUNTER
PCP: Phillip Murillo MD    Last appt: 11/3/2021  No future appointments. Requested Prescriptions     Pending Prescriptions Disp Refills    metFORMIN (GLUCOPHAGE) 500 mg tablet 360 Tablet 3     Sig: Take 2 Tablets by mouth two (2) times daily (with meals).        Prior labs and Blood pressures:  BP Readings from Last 3 Encounters:   11/03/21 130/84   08/23/21 132/79   09/15/20 (!) 121/58     Lab Results   Component Value Date/Time    Sodium 138 06/26/2020 09:06 AM    Potassium 4.6 06/26/2020 09:06 AM    Chloride 107 06/26/2020 09:06 AM    CO2 24 06/26/2020 09:06 AM    Anion gap 7 06/26/2020 09:06 AM    Glucose 171 (H) 06/26/2020 09:06 AM    BUN 16 06/26/2020 09:06 AM    Creatinine 0.65 06/26/2020 09:06 AM    BUN/Creatinine ratio 25 (H) 06/26/2020 09:06 AM    GFR est AA >60 06/26/2020 09:06 AM    GFR est non-AA >60 06/26/2020 09:06 AM    Calcium 9.7 06/26/2020 09:06 AM     Lab Results   Component Value Date/Time    Hemoglobin A1c 6.6 (H) 08/16/2021 08:52 AM     Lab Results   Component Value Date/Time    Cholesterol, total 117 06/26/2020 09:06 AM    HDL Cholesterol 58 06/26/2020 09:06 AM    LDL, calculated 44.6 06/26/2020 09:06 AM    VLDL, calculated 14.4 06/26/2020 09:06 AM    Triglyceride 72 06/26/2020 09:06 AM    CHOL/HDL Ratio 2.0 06/26/2020 09:06 AM     No results found for: MARCE Calles    Lab Results   Component Value Date/Time    TSH 1.260 06/05/2019 08:44 AM

## 2022-05-25 RX ORDER — METFORMIN HYDROCHLORIDE 500 MG/1
1000 TABLET ORAL 2 TIMES DAILY WITH MEALS
Qty: 360 TABLET | Refills: 3 | Status: SHIPPED | OUTPATIENT
Start: 2022-05-25

## 2022-08-11 LAB — HBA1C MFR BLD HPLC: 7.3 %

## 2022-08-19 ENCOUNTER — TELEPHONE (OUTPATIENT)
Dept: FAMILY MEDICINE CLINIC | Age: 67
End: 2022-08-19

## 2022-10-12 NOTE — TELEPHONE ENCOUNTER
FYI!!    Called patient to let them know they are due for an appointment to get medication refills of her Levothyroxine

## 2023-05-26 RX ORDER — LEVOTHYROXINE SODIUM 0.07 MG/1
1 TABLET ORAL DAILY
COMMUNITY
Start: 2021-08-23

## 2023-05-26 RX ORDER — ASPIRIN 81 MG/1
TABLET ORAL DAILY
COMMUNITY

## 2023-05-26 RX ORDER — METRONIDAZOLE 7.5 MG/G
GEL TOPICAL 2 TIMES DAILY
COMMUNITY
Start: 2021-08-23

## 2023-05-26 RX ORDER — LISINOPRIL 5 MG/1
1 TABLET ORAL DAILY
COMMUNITY
Start: 2021-08-22

## 2023-05-26 RX ORDER — NYSTATIN 100000 [USP'U]/G
POWDER TOPICAL 4 TIMES DAILY
COMMUNITY
Start: 2021-11-03

## 2023-07-26 RX ORDER — DULAGLUTIDE 4.5 MG/.5ML
INJECTION, SOLUTION SUBCUTANEOUS
COMMUNITY
Start: 2023-07-22

## 2023-07-26 NOTE — TELEPHONE ENCOUNTER
PCP: Carolina Elliott MD    Last appt: 11/03/21        No future appointments.     Requested Prescriptions     Pending Prescriptions Disp Refills    metFORMIN (GLUCOPHAGE) 500 MG tablet 360 tablet 3     Sig: Take 1 tablet by mouth 2 times daily (with meals)       Prior labs and Blood pressures:  BP Readings from Last 3 Encounters:   11/03/21 130/84   08/23/21 132/79     Lab Results   Component Value Date/Time     06/26/2020 09:06 AM    K 4.6 06/26/2020 09:06 AM     06/26/2020 09:06 AM    CO2 24 06/26/2020 09:06 AM    BUN 16 06/26/2020 09:06 AM    GFRAA >60 06/26/2020 09:06 AM     No results found for: HBA1C, [id number]  Lab Results   Component Value Date/Time    CHOL 117 06/26/2020 09:06 AM    HDL 58 06/26/2020 09:06 AM     No results found for: VITD3, VD3RIA    No results found for: TSH, TSH2, TSH3